# Patient Record
Sex: FEMALE | Race: WHITE
[De-identification: names, ages, dates, MRNs, and addresses within clinical notes are randomized per-mention and may not be internally consistent; named-entity substitution may affect disease eponyms.]

---

## 2017-11-06 ENCOUNTER — APPOINTMENT (OUTPATIENT)
Dept: INTERNAL MEDICINE | Facility: CLINIC | Age: 52
End: 2017-11-06

## 2018-07-01 ENCOUNTER — OUTPATIENT (OUTPATIENT)
Dept: OUTPATIENT SERVICES | Facility: HOSPITAL | Age: 53
LOS: 1 days | End: 2018-07-01
Payer: MEDICAID

## 2018-07-01 DIAGNOSIS — H53.003 UNSPECIFIED AMBLYOPIA, BILATERAL: Chronic | ICD-10-CM

## 2018-07-19 DIAGNOSIS — Z71.89 OTHER SPECIFIED COUNSELING: ICD-10-CM

## 2019-08-01 PROCEDURE — G9005: CPT

## 2019-09-01 ENCOUNTER — OUTPATIENT (OUTPATIENT)
Dept: OUTPATIENT SERVICES | Facility: HOSPITAL | Age: 54
LOS: 1 days | End: 2019-09-01
Payer: MEDICARE

## 2019-09-01 DIAGNOSIS — H53.003 UNSPECIFIED AMBLYOPIA, BILATERAL: Chronic | ICD-10-CM

## 2019-09-27 DIAGNOSIS — Z71.89 OTHER SPECIFIED COUNSELING: ICD-10-CM

## 2020-02-01 PROCEDURE — G9005: CPT

## 2021-02-17 ENCOUNTER — APPOINTMENT (OUTPATIENT)
Dept: CARDIOLOGY | Facility: CLINIC | Age: 56
End: 2021-02-17
Payer: MEDICARE

## 2021-02-17 ENCOUNTER — NON-APPOINTMENT (OUTPATIENT)
Age: 56
End: 2021-02-17

## 2021-02-17 VITALS
OXYGEN SATURATION: 99 % | WEIGHT: 122 LBS | BODY MASS INDEX: 22.45 KG/M2 | DIASTOLIC BLOOD PRESSURE: 60 MMHG | HEIGHT: 61.75 IN | SYSTOLIC BLOOD PRESSURE: 110 MMHG | HEART RATE: 79 BPM

## 2021-02-17 DIAGNOSIS — R06.00 DYSPNEA, UNSPECIFIED: ICD-10-CM

## 2021-02-17 DIAGNOSIS — R94.31 ABNORMAL ELECTROCARDIOGRAM [ECG] [EKG]: ICD-10-CM

## 2021-02-17 PROCEDURE — 93000 ELECTROCARDIOGRAM COMPLETE: CPT

## 2021-02-17 PROCEDURE — 99204 OFFICE O/P NEW MOD 45 MIN: CPT | Mod: 25

## 2021-02-17 RX ORDER — TOPIRAMATE 100 MG/1
100 TABLET, COATED ORAL
Refills: 0 | Status: ACTIVE | COMMUNITY

## 2021-02-17 RX ORDER — SERTRALINE HYDROCHLORIDE 100 MG/1
100 TABLET, FILM COATED ORAL
Refills: 0 | Status: ACTIVE | COMMUNITY

## 2021-02-18 ENCOUNTER — APPOINTMENT (OUTPATIENT)
Dept: CARDIOLOGY | Facility: CLINIC | Age: 56
End: 2021-02-18
Payer: MEDICARE

## 2021-02-18 DIAGNOSIS — I65.29 OCCLUSION AND STENOSIS OF UNSPECIFIED CAROTID ARTERY: ICD-10-CM

## 2021-02-18 PROCEDURE — 93880 EXTRACRANIAL BILAT STUDY: CPT | Mod: 26

## 2021-02-19 ENCOUNTER — APPOINTMENT (OUTPATIENT)
Dept: CARDIOLOGY | Facility: CLINIC | Age: 56
End: 2021-02-19
Payer: MEDICARE

## 2021-02-19 DIAGNOSIS — Z82.49 FAMILY HISTORY OF ISCHEMIC HEART DISEASE AND OTHER DISEASES OF THE CIRCULATORY SYSTEM: ICD-10-CM

## 2021-02-19 DIAGNOSIS — Z78.9 OTHER SPECIFIED HEALTH STATUS: ICD-10-CM

## 2021-02-19 PROBLEM — I65.29 CAROTID ARTERY STENOSIS: Status: ACTIVE | Noted: 2021-02-19

## 2021-02-19 PROCEDURE — 93306 TTE W/DOPPLER COMPLETE: CPT

## 2021-02-22 ENCOUNTER — NON-APPOINTMENT (OUTPATIENT)
Age: 56
End: 2021-02-22

## 2021-02-22 PROBLEM — R06.00 DOE (DYSPNEA ON EXERTION): Status: ACTIVE | Noted: 2021-02-22

## 2021-02-22 PROBLEM — R94.31 ABNORMAL EKG: Status: ACTIVE | Noted: 2021-02-19
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
## 2021-02-22 NOTE — DISCUSSION/SUMMARY
[FreeTextEntry1] : Pt is a 57 y/o F who is referred here today by their PCP for evaluation.  She is scheduled for repair of her eardrum 3/9/2021.   She states that she gets SOB from time to time which started years ago when she exerts herself.  She denies CP, diaphoresis, palpitations, dizziness, syncope, LE edema, PND, orthopnea. \par Will check transthoracic echocardiogram to evaluate left ventricular function and assess for any structural abnormalities\par will perform ETT to assess patient's current cardiac reserve to incremental activity and check for provocable ECG changes.\par check CUS\par Further preop recommendations will be made once diagnostic testing is complete. \par VSS\par The described plan was discussed with the pt.  All questions and concerns were addressed to the best of my knowledge.

## 2021-02-24 ENCOUNTER — APPOINTMENT (OUTPATIENT)
Dept: CARDIOLOGY | Facility: CLINIC | Age: 56
End: 2021-02-24
Payer: MEDICARE

## 2021-02-24 PROCEDURE — 93015 CV STRESS TEST SUPVJ I&R: CPT

## 2021-02-25 ENCOUNTER — NON-APPOINTMENT (OUTPATIENT)
Age: 56
End: 2021-02-25

## 2021-07-12 ENCOUNTER — APPOINTMENT (OUTPATIENT)
Dept: OTOLARYNGOLOGY | Facility: CLINIC | Age: 56
End: 2021-07-12
Payer: MEDICARE

## 2021-07-12 VITALS
BODY MASS INDEX: 23 KG/M2 | HEART RATE: 80 BPM | HEIGHT: 61.75 IN | TEMPERATURE: 98 F | DIASTOLIC BLOOD PRESSURE: 78 MMHG | WEIGHT: 125 LBS | SYSTOLIC BLOOD PRESSURE: 106 MMHG

## 2021-07-12 PROCEDURE — 99072 ADDL SUPL MATRL&STAF TM PHE: CPT

## 2021-07-12 PROCEDURE — 99204 OFFICE O/P NEW MOD 45 MIN: CPT

## 2021-07-12 PROCEDURE — 92567 TYMPANOMETRY: CPT

## 2021-07-12 PROCEDURE — 92557 COMPREHENSIVE HEARING TEST: CPT

## 2021-07-12 RX ORDER — ERGOCALCIFEROL 1.25 MG/1
1.25 MG CAPSULE, LIQUID FILLED ORAL
Qty: 12 | Refills: 0 | Status: ACTIVE | COMMUNITY
Start: 2021-04-28

## 2021-07-12 RX ORDER — MOMETASONE FUROATE 1 MG/G
0.1 OINTMENT TOPICAL
Qty: 45 | Refills: 0 | Status: ACTIVE | COMMUNITY
Start: 2021-04-22

## 2021-07-12 NOTE — HISTORY OF PRESENT ILLNESS
[de-identified] : Has problem with left ear. Problem since 2016 - has been on abx from primary care.  Then saw ENT and told of fungal infection.   Then told of TM perforation 2020 told of tm perf.  Then  had ear surgery 2021 for TM perforation with Dr Mccord.    Hole now bigger - surgery did not close with surgery.  Now ? infection again.  Does not keep ear dry.  Recently on abx from primary care.

## 2021-07-12 NOTE — ASSESSMENT
[FreeTextEntry1] : Patient with long hx of ear nfections in left ear.  Was treated with drops for years and was told of fungal infection and then developed left tm perforation. Had tympanoplasty with Dr Mccord in 2020 - not successful and has marginal anterior left tm perforation.  No infection noted today .  Has significant conductive loss and some epithelial debris around perforaton.  Recommended dry ear precautions and will get otology eval with Dr Swan regarding possible need for further imaging and possible revision surgery

## 2021-07-12 NOTE — REVIEW OF SYSTEMS
[Ear Pain] : ear pain [Hearing Loss] : hearing loss [Dizziness] : dizziness [Vertigo] : vertigo [Throat Clearing] : throat clearing [Feeling Tired] : feeling tired [Joint Pain] : joint pain [Negative] : Heme/Lymph [de-identified] : headache

## 2021-07-12 NOTE — PHYSICAL EXAM
[Midline] : trachea located in midline position [Normal] : no rashes [de-identified] : sl inflammation of left eac - no drainage; right normal  - bilateral narrow eac - rolands powder instilled  [de-identified] : right tm normal; left dry sl moist eac with small perforation anteriorly near annulus .- seen with scope

## 2021-08-04 ENCOUNTER — APPOINTMENT (OUTPATIENT)
Dept: OTOLARYNGOLOGY | Facility: CLINIC | Age: 56
End: 2021-08-04
Payer: MEDICARE

## 2021-08-04 VITALS
WEIGHT: 125 LBS | DIASTOLIC BLOOD PRESSURE: 74 MMHG | SYSTOLIC BLOOD PRESSURE: 121 MMHG | HEIGHT: 61.75 IN | HEART RATE: 66 BPM | BODY MASS INDEX: 23 KG/M2

## 2021-08-04 PROCEDURE — 69210 REMOVE IMPACTED EAR WAX UNI: CPT

## 2021-08-04 PROCEDURE — 99214 OFFICE O/P EST MOD 30 MIN: CPT | Mod: 25

## 2021-08-20 ENCOUNTER — APPOINTMENT (OUTPATIENT)
Dept: PHARMACY | Facility: CLINIC | Age: 56
End: 2021-08-20

## 2021-11-15 ENCOUNTER — APPOINTMENT (OUTPATIENT)
Dept: OTOLARYNGOLOGY | Facility: CLINIC | Age: 56
End: 2021-11-15

## 2021-11-30 ENCOUNTER — APPOINTMENT (OUTPATIENT)
Dept: FAMILY MEDICINE | Facility: CLINIC | Age: 56
End: 2021-11-30

## 2022-01-05 ENCOUNTER — APPOINTMENT (OUTPATIENT)
Dept: FAMILY MEDICINE | Facility: CLINIC | Age: 57
End: 2022-01-05

## 2022-01-10 ENCOUNTER — NON-APPOINTMENT (OUTPATIENT)
Age: 57
End: 2022-01-10

## 2022-01-10 ENCOUNTER — APPOINTMENT (OUTPATIENT)
Dept: CARDIOLOGY | Facility: CLINIC | Age: 57
End: 2022-01-10
Payer: MEDICARE

## 2022-01-10 VITALS
SYSTOLIC BLOOD PRESSURE: 110 MMHG | HEIGHT: 61.75 IN | OXYGEN SATURATION: 100 % | WEIGHT: 131 LBS | DIASTOLIC BLOOD PRESSURE: 70 MMHG | BODY MASS INDEX: 24.11 KG/M2 | HEART RATE: 76 BPM

## 2022-01-10 DIAGNOSIS — Z01.810 ENCOUNTER FOR PREPROCEDURAL CARDIOVASCULAR EXAMINATION: ICD-10-CM

## 2022-01-10 PROCEDURE — 93000 ELECTROCARDIOGRAM COMPLETE: CPT | Mod: NC

## 2022-01-10 PROCEDURE — 99214 OFFICE O/P EST MOD 30 MIN: CPT | Mod: 25
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
## 2022-01-10 NOTE — DISCUSSION/SUMMARY
[FreeTextEntry1] : Pt is a 57 y/o F PMH DM.  She is scheduled for repair of her eardrum 02/08/2022.   She states that she has been feeling well overall and has no new complaints - denies CP, SOB, diaphoresis, palpitations, dizziness, syncope, LE edema, PND, orthopnea. \par \par TTE 02/2021 EF 55-60%, trace to mild TR\par ETT 02/2021 no ischemic changes\par \par At this time the pt has no signs or symptoms of active ischemia, heart failure, life-threatening arrhythmias, severe valvular pathology.\par VSS\par There are no cardiac contraindications for planned procedure. \par \par Pt will return in 6 mnths or sooner as needed but I encouraged communication via phone or portal if necessary. \par The described plan was discussed with the pt.  All questions and concerns were addressed to the best of my knowledge.

## 2022-01-19 ENCOUNTER — APPOINTMENT (OUTPATIENT)
Dept: FAMILY MEDICINE | Facility: CLINIC | Age: 57
End: 2022-01-19

## 2022-03-31 ENCOUNTER — APPOINTMENT (OUTPATIENT)
Dept: ENDOCRINOLOGY | Facility: CLINIC | Age: 57
End: 2022-03-31

## 2022-04-20 ENCOUNTER — FORM ENCOUNTER (OUTPATIENT)
Age: 57
End: 2022-04-20

## 2022-07-21 ENCOUNTER — APPOINTMENT (OUTPATIENT)
Dept: ENDOCRINOLOGY | Facility: CLINIC | Age: 57
End: 2022-07-21

## 2022-07-21 VITALS
OXYGEN SATURATION: 97 % | RESPIRATION RATE: 15 BRPM | HEART RATE: 76 BPM | SYSTOLIC BLOOD PRESSURE: 104 MMHG | WEIGHT: 129 LBS | TEMPERATURE: 98.3 F | HEIGHT: 61 IN | DIASTOLIC BLOOD PRESSURE: 70 MMHG | BODY MASS INDEX: 24.35 KG/M2

## 2022-07-21 DIAGNOSIS — E11.9 TYPE 2 DIABETES MELLITUS W/OUT COMPLICATIONS: ICD-10-CM

## 2022-07-21 DIAGNOSIS — E78.5 HYPERLIPIDEMIA, UNSPECIFIED: ICD-10-CM

## 2022-07-21 PROCEDURE — 99204 OFFICE O/P NEW MOD 45 MIN: CPT

## 2022-07-21 RX ORDER — DIAZEPAM 2 MG/1
2 TABLET ORAL
Qty: 120 | Refills: 0 | Status: COMPLETED | COMMUNITY
Start: 2021-05-26 | End: 2022-07-21

## 2022-07-21 RX ORDER — TOPIRAMATE 200 MG/1
200 TABLET ORAL
Qty: 60 | Refills: 0 | Status: COMPLETED | COMMUNITY
Start: 2021-05-26 | End: 2022-07-21

## 2022-07-21 RX ORDER — METFORMIN HYDROCHLORIDE 1000 MG/1
1000 TABLET, FILM COATED, EXTENDED RELEASE ORAL
Refills: 0 | Status: COMPLETED | COMMUNITY
End: 2022-07-21

## 2022-07-21 RX ORDER — METFORMIN ER 500 MG 500 MG/1
500 TABLET ORAL
Qty: 360 | Refills: 0 | Status: COMPLETED | COMMUNITY
Start: 2020-11-18 | End: 2022-07-21

## 2022-11-11 ENCOUNTER — APPOINTMENT (OUTPATIENT)
Dept: NEUROLOGY | Facility: CLINIC | Age: 57
End: 2022-11-11

## 2022-11-11 VITALS
DIASTOLIC BLOOD PRESSURE: 67 MMHG | HEART RATE: 67 BPM | HEIGHT: 61 IN | WEIGHT: 124 LBS | BODY MASS INDEX: 23.41 KG/M2 | SYSTOLIC BLOOD PRESSURE: 100 MMHG

## 2023-01-19 ENCOUNTER — APPOINTMENT (OUTPATIENT)
Dept: ENDOCRINOLOGY | Facility: CLINIC | Age: 58
End: 2023-01-19

## 2023-01-20 RX ORDER — METFORMIN ER 500 MG 500 MG/1
500 TABLET ORAL
Qty: 60 | Refills: 3 | Status: ACTIVE | COMMUNITY
Start: 2022-07-21 | End: 1900-01-01

## 2023-03-20 ENCOUNTER — APPOINTMENT (OUTPATIENT)
Dept: OTOLARYNGOLOGY | Facility: CLINIC | Age: 58
End: 2023-03-20
Payer: MEDICARE

## 2023-03-20 VITALS
BODY MASS INDEX: 24.55 KG/M2 | HEART RATE: 73 BPM | WEIGHT: 130 LBS | HEIGHT: 61 IN | DIASTOLIC BLOOD PRESSURE: 76 MMHG | SYSTOLIC BLOOD PRESSURE: 111 MMHG

## 2023-03-20 DIAGNOSIS — H61.22 IMPACTED CERUMEN, LEFT EAR: ICD-10-CM

## 2023-03-20 DIAGNOSIS — H66.92 OTITIS MEDIA, UNSPECIFIED, LEFT EAR: ICD-10-CM

## 2023-03-20 DIAGNOSIS — H90.3 SENSORINEURAL HEARING LOSS, BILATERAL: ICD-10-CM

## 2023-03-20 DIAGNOSIS — H72.92 UNSPECIFIED PERFORATION OF TYMPANIC MEMBRANE, LEFT EAR: ICD-10-CM

## 2023-03-20 PROCEDURE — 92557 COMPREHENSIVE HEARING TEST: CPT

## 2023-03-20 PROCEDURE — G0268 REMOVAL OF IMPACTED WAX MD: CPT

## 2023-03-20 PROCEDURE — 92567 TYMPANOMETRY: CPT

## 2023-03-20 PROCEDURE — 99214 OFFICE O/P EST MOD 30 MIN: CPT | Mod: 25

## 2023-03-20 NOTE — DATA REVIEWED
[de-identified] : type A tymp AD; CNS AS\par AD: hearing -6000 Hz to a mild HF HL at 8000 Hz\par AS: mild to moderate -8000 Hz
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
## 2023-03-20 NOTE — ASSESSMENT
[FreeTextEntry1] : 58-year-old female presents for follow-up for left tympanic membrane perforation.  Underwent second attempt at repair by Dr. Mccord which was unsuccessful since last visit.  Perforation initially developed after left fungal OE.  Reports no pain or drainage from left ear, stable hearing loss in left ear.  Otoscopic exam today shows narrow patent bilateral ear canals.  Intact left postauricular incision.  She has a perforation anterior to the malleus handle, the anterior extent of which I cannot visualize due to a prominent anterior canal bulge.  The right tympanic membrane is intact without effusion or retraction.  Bilateral facial nerve function is normal.  I personally ordered and reviewed an audiogram for her hearing loss, which shows a left conductive hearing loss and normal hearing in the right ear.\par \par Again discussed options for management, including observation, with hearing aid usage, earplug usage when swimming, or left tympanoplasty.  Counseled patient that because this would be her third procedure, the likelihood of successful surgery is lower.  Also counseled her regarding risk of tympanic membrane lateralization and residual conductive hearing loss due to location of perforation, and she expressed understanding.   She wishes to move forward with surgery.  Patient will most likely require anterior canalplasty to expose anterior extent of perforation.  Plan to obtain temporal bone CT for surgical planning purposes.\par \par Discussion of tympanoplasty surgery risks, benefits, and alternatives:\par \par Risks, benefits, and alternatives of tympanoplasty were discussed with the patient.  Risks would include but are not limited to bleeding, infection, persistent pain, persistent perforation, persistent drainage, failure to improve hearing, worsened hearing including risk of profound hearing loss, recurrent serous effusion, persistent dizziness, persistent tinnitus, facial nerve injury, or taste changes.  Benefits would include closure of the perforation, reduction or elimination of drainage, possible improvement in hearing, and prevention of the need to maintain water precautions.  Alternatives would include observation or hearing aid use.  The patient agrees to proceed with surgery.\par

## 2023-04-25 NOTE — REASON FOR VISIT
[Subsequent Evaluation] : a subsequent evaluation for [FreeTextEntry2] : Patient here to check on hole on ear LT

## 2023-05-03 DIAGNOSIS — Z01.818 ENCOUNTER FOR OTHER PREPROCEDURAL EXAMINATION: ICD-10-CM

## 2023-05-18 ENCOUNTER — APPOINTMENT (OUTPATIENT)
Dept: ENDOCRINOLOGY | Facility: CLINIC | Age: 58
End: 2023-05-18

## 2023-06-14 ENCOUNTER — APPOINTMENT (OUTPATIENT)
Dept: OTOLARYNGOLOGY | Facility: AMBULATORY SURGERY CENTER | Age: 58
End: 2023-06-14

## 2023-07-10 ENCOUNTER — APPOINTMENT (OUTPATIENT)
Dept: OTOLARYNGOLOGY | Facility: CLINIC | Age: 58
End: 2023-07-10

## 2024-12-02 ENCOUNTER — OFFICE (OUTPATIENT)
Dept: URBAN - METROPOLITAN AREA CLINIC 100 | Facility: CLINIC | Age: 59
Setting detail: OPHTHALMOLOGY
End: 2024-12-02
Payer: MEDICARE

## 2024-12-02 DIAGNOSIS — H02.831: ICD-10-CM

## 2024-12-02 DIAGNOSIS — H53.40: ICD-10-CM

## 2024-12-02 DIAGNOSIS — H02.834: ICD-10-CM

## 2024-12-02 PROCEDURE — 92285 EXTERNAL OCULAR PHOTOGRAPHY: CPT | Performed by: OPHTHALMOLOGY

## 2024-12-02 PROCEDURE — 99204 OFFICE O/P NEW MOD 45 MIN: CPT | Performed by: OPHTHALMOLOGY

## 2024-12-02 PROCEDURE — 92081 LIMITED VISUAL FIELD XM: CPT | Performed by: OPHTHALMOLOGY

## 2024-12-02 ASSESSMENT — REFRACTION_AUTOREFRACTION
OS_AXIS: 177
OS_CYLINDER: -0.25
OD_SPHERE: -0.75
OD_CYLINDER: SPHERE
OD_AXIS: 0
OS_SPHERE: -0.50

## 2024-12-02 ASSESSMENT — VISUAL ACUITY
OD_BCVA: 20/25
OS_BCVA: 20/25-1

## 2024-12-02 ASSESSMENT — LID POSITION - DERMATOCHALASIS
OD_DERMATOCHALASIS: RUL
OS_DERMATOCHALASIS: LUL

## 2024-12-02 ASSESSMENT — KERATOMETRY
OS_AXISANGLE_DEGREES: 087
OS_K1POWER_DIOPTERS: 44.00
OD_K2POWER_DIOPTERS: 45.00
OD_K1POWER_DIOPTERS: 44.00
OS_K2POWER_DIOPTERS: 45.50
OD_AXISANGLE_DEGREES: 078

## 2024-12-02 ASSESSMENT — CONFRONTATIONAL VISUAL FIELD TEST (CVF)
OS_FINDINGS: FULL
OD_FINDINGS: FULL

## 2024-12-03 ENCOUNTER — OFFICE (OUTPATIENT)
Dept: URBAN - METROPOLITAN AREA CLINIC 100 | Facility: CLINIC | Age: 59
Setting detail: OPHTHALMOLOGY
End: 2024-12-03
Payer: MEDICARE

## 2024-12-03 DIAGNOSIS — H52.4: ICD-10-CM

## 2024-12-03 DIAGNOSIS — H16.223: ICD-10-CM

## 2024-12-03 DIAGNOSIS — H50.00: ICD-10-CM

## 2024-12-03 DIAGNOSIS — H25.13: ICD-10-CM

## 2024-12-03 PROBLEM — H53.40 VISUAL FIELD DEFECT: Status: ACTIVE | Noted: 2024-12-02

## 2024-12-03 PROBLEM — H02.831 DERMATOCHALASIS; RIGHT UPPER LID, LEFT UPPER LID: Status: ACTIVE | Noted: 2024-12-02

## 2024-12-03 PROBLEM — Z41.1 ENCOUNTER FOR COSMETIC SURGERY: Status: ACTIVE | Noted: 2024-12-02

## 2024-12-03 PROBLEM — H52.7 REFRACTIVE ERROR ; BOTH EYES: Status: ACTIVE | Noted: 2024-12-03

## 2024-12-03 PROBLEM — H02.834 DERMATOCHALASIS; RIGHT UPPER LID, LEFT UPPER LID: Status: ACTIVE | Noted: 2024-12-02

## 2024-12-03 PROCEDURE — 92014 COMPRE OPH EXAM EST PT 1/>: CPT | Performed by: OPHTHALMOLOGY

## 2024-12-03 PROCEDURE — 92060 SENSORIMOTOR EXAMINATION: CPT | Performed by: OPHTHALMOLOGY

## 2024-12-03 PROCEDURE — 92015 DETERMINE REFRACTIVE STATE: CPT | Performed by: OPHTHALMOLOGY

## 2024-12-03 ASSESSMENT — LID POSITION - DERMATOCHALASIS
OD_DERMATOCHALASIS: RUL
OS_DERMATOCHALASIS: LUL

## 2024-12-03 ASSESSMENT — REFRACTION_CURRENTRX
OS_SPHERE: -0.25
OS_VPRISM_DIRECTION: BF
OD_CYLINDER: -0.25
OS_OVR_VA: 20/
OS_CYLINDER: SPHERE
OD_OVR_VA: 20/
OD_SPHERE: -0.75
OS_ADD: +2.75
OD_AXIS: 144
OD_ADD: +2.75
OS_AXIS: 0
OD_VPRISM_DIRECTION: BF

## 2024-12-03 ASSESSMENT — VISUAL ACUITY
OD_BCVA: 20/30-1
OS_BCVA: 20/30-2

## 2024-12-03 ASSESSMENT — REFRACTION_MANIFEST
OS_CYLINDER: SPHERE
OS_VA1: 20/25-2
OD_SPHERE: -0.75
OD_VA1: 20/30-
OS_HPRISM: 3.5
OS_SPHERE: -0.50
OS_VPRISM_DIRECTION: BO
OS_ADD: +2.75
OD_VA1: 20/20-2
OU_VA: 20/30
OS_VA1: 20/30
OD_ADD: +2.75
OS_SPHERE: -0.25
OD_AXIS: 100
OD_VPRISM_DIRECTION: BO
OS_AXIS: 081
OD_SPHERE: -0.50
OS_CYLINDER: -0.25
OD_CYLINDER: -0.25
OD_HPRISM: 3.5

## 2024-12-03 ASSESSMENT — CONFRONTATIONAL VISUAL FIELD TEST (CVF)
OS_FINDINGS: FULL
OD_FINDINGS: FULL

## 2024-12-03 ASSESSMENT — REFRACTION_AUTOREFRACTION
OS_CYLINDER: -0.25
OS_SPHERE: +0.50
OD_CYLINDER: -0.25
OD_SPHERE: -0.25
OS_AXIS: 081
OD_AXIS: 100

## 2024-12-03 ASSESSMENT — TONOMETRY
OD_IOP_MMHG: 17
OS_IOP_MMHG: 17

## 2024-12-03 ASSESSMENT — SUPERFICIAL PUNCTATE KERATITIS (SPK)
OS_SPK: T
OD_SPK: T

## 2024-12-11 ENCOUNTER — APPOINTMENT (OUTPATIENT)
Dept: ORTHOPEDIC SURGERY | Facility: CLINIC | Age: 59
End: 2024-12-11

## 2025-01-13 ENCOUNTER — OFFICE (OUTPATIENT)
Dept: URBAN - METROPOLITAN AREA CLINIC 100 | Facility: CLINIC | Age: 60
Setting detail: OPHTHALMOLOGY
End: 2025-01-13
Payer: MEDICARE

## 2025-01-13 DIAGNOSIS — H02.834: ICD-10-CM

## 2025-01-13 DIAGNOSIS — H16.221: ICD-10-CM

## 2025-01-13 DIAGNOSIS — H02.831: ICD-10-CM

## 2025-01-13 DIAGNOSIS — H16.223: ICD-10-CM

## 2025-01-13 DIAGNOSIS — H53.40: ICD-10-CM

## 2025-01-13 DIAGNOSIS — H16.222: ICD-10-CM

## 2025-01-13 PROCEDURE — 83861 MICROFLUID ANALY TEARS: CPT | Mod: QW,RT | Performed by: OPHTHALMOLOGY

## 2025-01-13 PROCEDURE — 83861 MICROFLUID ANALY TEARS: CPT | Mod: QW,LT | Performed by: OPHTHALMOLOGY

## 2025-01-13 PROCEDURE — 92012 INTRM OPH EXAM EST PATIENT: CPT | Performed by: OPHTHALMOLOGY

## 2025-01-13 ASSESSMENT — REFRACTION_AUTOREFRACTION
OD_AXIS: 100
OD_SPHERE: -0.25
OS_AXIS: 081
OS_SPHERE: +0.50
OD_CYLINDER: -0.25
OS_CYLINDER: -0.25

## 2025-01-13 ASSESSMENT — SUPERFICIAL PUNCTATE KERATITIS (SPK)
OS_SPK: T
OD_SPK: T

## 2025-01-13 ASSESSMENT — VISUAL ACUITY
OD_BCVA: 20/30-1
OS_BCVA: 20/30-2

## 2025-01-13 ASSESSMENT — CONFRONTATIONAL VISUAL FIELD TEST (CVF)
OD_FINDINGS: FULL
OS_FINDINGS: FULL

## 2025-01-13 ASSESSMENT — LID POSITION - DERMATOCHALASIS
OD_DERMATOCHALASIS: RUL
OS_DERMATOCHALASIS: LUL

## 2025-01-27 ENCOUNTER — RX ONLY (RX ONLY)
Age: 60
End: 2025-01-27

## 2025-01-27 ENCOUNTER — OFFICE (OUTPATIENT)
Dept: URBAN - METROPOLITAN AREA CLINIC 100 | Facility: CLINIC | Age: 60
Setting detail: OPHTHALMOLOGY
End: 2025-01-27
Payer: MEDICARE

## 2025-01-27 DIAGNOSIS — H02.831: ICD-10-CM

## 2025-01-27 DIAGNOSIS — H02.834: ICD-10-CM

## 2025-01-27 PROCEDURE — 15823 BLEPHARP UPR EYELID XCSV SKN: CPT | Mod: 50 | Performed by: OPHTHALMOLOGY

## 2025-01-27 ASSESSMENT — CONFRONTATIONAL VISUAL FIELD TEST (CVF)
OS_FINDINGS: FULL
OD_FINDINGS: FULL

## 2025-01-27 ASSESSMENT — REFRACTION_AUTOREFRACTION
OD_CYLINDER: -0.25
OS_SPHERE: +0.50
OD_AXIS: 100
OD_SPHERE: -0.25
OS_AXIS: 081
OS_CYLINDER: -0.25

## 2025-01-27 ASSESSMENT — VISUAL ACUITY
OS_BCVA: 20/30-2
OD_BCVA: 20/30-1

## 2025-01-27 ASSESSMENT — LID POSITION - DERMATOCHALASIS
OD_DERMATOCHALASIS: RUL
OS_DERMATOCHALASIS: LUL

## 2025-02-05 ENCOUNTER — OFFICE (OUTPATIENT)
Dept: URBAN - METROPOLITAN AREA CLINIC 102 | Facility: CLINIC | Age: 60
Setting detail: OPHTHALMOLOGY
End: 2025-02-05
Payer: MEDICARE

## 2025-02-05 DIAGNOSIS — H02.834: ICD-10-CM

## 2025-02-05 DIAGNOSIS — H02.831: ICD-10-CM

## 2025-02-05 PROCEDURE — 99024 POSTOP FOLLOW-UP VISIT: CPT | Performed by: OPHTHALMOLOGY

## 2025-02-05 ASSESSMENT — VISUAL ACUITY
OS_BCVA: 20/30
OD_BCVA: 20/30-1

## 2025-02-05 ASSESSMENT — LID POSITION - COMMENTS
OS_COMMENTS: INCISION INTACT
OD_COMMENTS: INCISION INTACT

## 2025-02-05 ASSESSMENT — CONFRONTATIONAL VISUAL FIELD TEST (CVF)
OD_FINDINGS: FULL
OS_FINDINGS: FULL

## 2025-02-05 ASSESSMENT — REFRACTION_AUTOREFRACTION
OS_CYLINDER: -0.25
OD_AXIS: 100
OS_AXIS: 081
OS_SPHERE: +0.50
OD_SPHERE: -0.25
OD_CYLINDER: -0.25

## 2025-02-05 ASSESSMENT — LID POSITION - DERMATOCHALASIS
OD_DERMATOCHALASIS: ABSENT
OS_DERMATOCHALASIS: ABSENT

## 2025-02-05 ASSESSMENT — SUPERFICIAL PUNCTATE KERATITIS (SPK)
OD_SPK: T
OS_SPK: T

## 2025-03-05 ENCOUNTER — OFFICE (OUTPATIENT)
Dept: URBAN - METROPOLITAN AREA CLINIC 102 | Facility: CLINIC | Age: 60
Setting detail: OPHTHALMOLOGY
End: 2025-03-05
Payer: MEDICARE

## 2025-03-05 DIAGNOSIS — H02.831: ICD-10-CM

## 2025-03-05 DIAGNOSIS — H02.834: ICD-10-CM

## 2025-03-05 DIAGNOSIS — H16.223: ICD-10-CM

## 2025-03-05 PROCEDURE — 99024 POSTOP FOLLOW-UP VISIT: CPT | Performed by: OPHTHALMOLOGY

## 2025-03-05 ASSESSMENT — SUPERFICIAL PUNCTATE KERATITIS (SPK)
OD_SPK: T
OS_SPK: T

## 2025-03-05 ASSESSMENT — CONFRONTATIONAL VISUAL FIELD TEST (CVF)
OS_FINDINGS: FULL
OD_FINDINGS: FULL

## 2025-03-05 ASSESSMENT — LID POSITION - DERMATOCHALASIS
OD_DERMATOCHALASIS: ABSENT
OS_DERMATOCHALASIS: ABSENT

## 2025-03-05 ASSESSMENT — VISUAL ACUITY
OS_BCVA: 20/25-
OD_BCVA: 20/25-

## 2025-03-05 ASSESSMENT — REFRACTION_AUTOREFRACTION
OS_AXIS: 081
OD_CYLINDER: -0.25
OS_SPHERE: +0.50
OD_SPHERE: -0.25
OD_AXIS: 100
OS_CYLINDER: -0.25

## 2025-03-05 ASSESSMENT — LID POSITION - COMMENTS
OD_COMMENTS: INCISION INTACT
OS_COMMENTS: INCISION INTACT

## 2025-08-25 DIAGNOSIS — Z12.39 ENCOUNTER FOR OTHER SCREENING FOR MALIGNANT NEOPLASM OF BREAST: ICD-10-CM

## 2025-08-25 DIAGNOSIS — Z13.820 ENCOUNTER FOR SCREENING FOR OSTEOPOROSIS: ICD-10-CM

## 2025-08-26 ENCOUNTER — APPOINTMENT (OUTPATIENT)
Dept: OBGYN | Facility: CLINIC | Age: 60
End: 2025-08-26
Payer: MEDICARE

## 2025-08-26 VITALS
HEIGHT: 60 IN | BODY MASS INDEX: 22.78 KG/M2 | SYSTOLIC BLOOD PRESSURE: 110 MMHG | WEIGHT: 116 LBS | DIASTOLIC BLOOD PRESSURE: 70 MMHG

## 2025-08-26 DIAGNOSIS — Z80.9 FAMILY HISTORY OF MALIGNANT NEOPLASM, UNSPECIFIED: ICD-10-CM

## 2025-08-26 DIAGNOSIS — Z78.9 OTHER SPECIFIED HEALTH STATUS: ICD-10-CM

## 2025-08-26 DIAGNOSIS — Z80.3 FAMILY HISTORY OF MALIGNANT NEOPLASM OF BREAST: ICD-10-CM

## 2025-08-26 DIAGNOSIS — Z12.4 ENCOUNTER FOR SCREENING FOR MALIGNANT NEOPLASM OF CERVIX: ICD-10-CM

## 2025-08-26 DIAGNOSIS — Z01.419 ENCOUNTER FOR GYNECOLOGICAL EXAMINATION (GENERAL) (ROUTINE) W/OUT ABNORMAL FINDINGS: ICD-10-CM

## 2025-08-26 DIAGNOSIS — Z80.1 FAMILY HISTORY OF MALIGNANT NEOPLASM OF TRACHEA, BRONCHUS AND LUNG: ICD-10-CM

## 2025-08-26 DIAGNOSIS — Z80.41 FAMILY HISTORY OF MALIGNANT NEOPLASM OF OVARY: ICD-10-CM

## 2025-08-26 DIAGNOSIS — Z80.0 FAMILY HISTORY OF MALIGNANT NEOPLASM OF DIGESTIVE ORGANS: ICD-10-CM

## 2025-08-26 DIAGNOSIS — Z11.3 ENCOUNTER FOR SCREENING FOR INFECTIONS WITH A PREDOMINANTLY SEXUAL MODE OF TRANSMISSION: ICD-10-CM

## 2025-08-26 PROCEDURE — 99386 PREV VISIT NEW AGE 40-64: CPT

## 2025-08-27 LAB
C TRACH RRNA SPEC QL NAA+PROBE: NOT DETECTED
HPV HIGH+LOW RISK DNA PNL CVX: NOT DETECTED
N GONORRHOEA RRNA SPEC QL NAA+PROBE: NOT DETECTED
SOURCE TP AMPLIFICATION: NORMAL

## 2025-08-28 LAB — CYTOLOGY CVX/VAG DOC THIN PREP: ABNORMAL

## 2025-09-16 ENCOUNTER — APPOINTMENT (OUTPATIENT)
Dept: OBGYN | Facility: CLINIC | Age: 60
End: 2025-09-16
Payer: MEDICARE

## 2025-09-16 DIAGNOSIS — Z80.9 FAMILY HISTORY OF MALIGNANT NEOPLASM, UNSPECIFIED: ICD-10-CM

## 2025-09-16 PROCEDURE — 36415 COLL VENOUS BLD VENIPUNCTURE: CPT

## 2025-09-16 PROCEDURE — 99215 OFFICE O/P EST HI 40 MIN: CPT
